# Patient Record
(demographics unavailable — no encounter records)

---

## 2024-12-30 NOTE — COUNSELING
[Cessation strategies including cessation program discussed] : Cessation strategies including cessation program discussed [Use of nicotine replacement therapies and other medications discussed] : Use of nicotine replacement therapies and other medications discussed [Encouraged to pick a quit date and identify support needed to quit] : Encouraged to pick a quit date and identify support needed to quit [Smoking Cessation Program Referral] : Smoking Cessation Program Referral  [Yes] : Willing to quit smoking [ - Annual Lung Cancer Screening/Share Decision Making Discussion] : Annual Lung Cancer Screening/Share Decision Making Discussion. (I have advised this patient to have a Low Dose CT (LDCT) scan of the lungs and have discussed the following with the patient in a shared decision making discussion:   Benefits of Detection and Early Treatment: There is adequate evidence that annual screening for lung cancer with LDCT in a population of high-risk persons can prevent a substantial number of lung cancer-related deaths. The magnitude of benefit depends on the individual patient's risk for lung cancer, as those who are at highest risk are most likely to benefit. Screening cannot prevent most lung cancer-related deaths, and does not replace smoking cessation. Harms of Detection and Early Intervention and Treatment: The harms associated with LDCT screening include false-negative and false-positive results, incidental findings, over diagnosis, and radiation exposure. False-positive LDCT results occur in a substantial proportion of screened persons; 95% of all positive results do not lead to a diagnosis of cancer. In a high-quality screening program, further imaging can resolve most false-positive results; however, some patients may require invasive procedures. Radiation harms, including cancer resulting from cumulative exposure to radiation, vary depending on the age at the start of screening; the number of scans received; and the person's exposure to other sources of radiation, particularly other medical imaging.) [FreeTextEntry2] : advised to call 311, patient has tried chantix in the past did not work for him  [FreeTextEntry1] : 10
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
## 2024-12-30 NOTE — ASSESSMENT
[FreeTextEntry1] : COPD/Emphysema mmrc>2 Unprovoked RLL Segmental and Subsegmental PE with borderline RHS Mediastinal Lymphadenopathy Right Renal Lesion referred to Urology Dilated ascending thoracic aorta referred to Cardiology Hernia pain referred to General Surgery Unspecified Depression referred to Behavioral Health, during my interview patient denies suicidal ideation, no active plan to harm oneself

## 2024-12-30 NOTE — PHYSICAL EXAM
[No Acute Distress] : no acute distress [Normal Oropharynx] : normal oropharynx [Normal Appearance] : normal appearance [No Neck Mass] : no neck mass [Normal Rate/Rhythm] : normal rate/rhythm [Normal S1, S2] : normal s1, s2 [No Murmurs] : no murmurs [No Resp Distress] : no resp distress [Clear to Auscultation Bilaterally] : clear to auscultation bilaterally [No Abnormalities] : no abnormalities [Benign] : benign [Normal Gait] : normal gait [No Clubbing] : no clubbing [No Cyanosis] : no cyanosis [No Edema] : no edema [FROM] : FROM [Normal Color/ Pigmentation] : normal color/ pigmentation [No Focal Deficits] : no focal deficits [Oriented x3] : oriented x3 [Normal Affect] : normal affect [TextBox_68] : bilateral rales, no wheezing

## 2024-12-30 NOTE — REVIEW OF SYSTEMS
Dyspnea] : dyspnea [SOB on Exertion] : sob on exertion [Negative] : Endocrine [TextBox_69] : intermittent pain at hernia site, history of GSW in 1989

## 2024-12-30 NOTE — REASON FOR VISIT
[Initial] : an initial visit [COPD] : COPD [Pulmonary Embolism] : pulmonary embolism [Emphysema] : emphysema

## 2025-01-20 NOTE — PHYSICAL EXAM
[Fully active, able to carry on all pre-disease performance without restriction] : Status 0 - Fully active, able to carry on all pre-disease performance without restriction [Normal] : full range of motion and no deformities appreciated [de-identified] : Ventral abdominal hernia noted.

## 2025-01-20 NOTE — HISTORY OF PRESENT ILLNESS
[de-identified] : 60-year-old male is here for recently diagnosed Pulmonary embolism.  He recently presented to the ED with abdominal pain and CT abdomen incidentally picked up right lower lobe segmental vs subsegmental pulmonary embolism. This finding was confirmed with CTA Chest. Venous Duplex of Lower extremity was negative for blood clots. He was started on Eliquis and discharged home. No prior history or family history of blood clots. He denies any recent long-distance travel, trauma, surgery. No fevers, weight loss or night sweats.  He denied any bleeding from anywhere. His Colonoscopy: more than 5 years ago, removed polyps x9. Advised repeat colonoscopy in 5 years. He is due for one now.  He has mild discomfort occasionally from abdominal hernia but no active complaints.   Imaging findings as follows. CTA Chest and CT abdomen and Pelvis. Right lower lobe segmental/subsegmental pulmonary emboli. Borderline right heart strain. Dilated ascending thoracic aorta measuring 4.5 cm. Prominent mediastinal lymph node is nonspecific and may be reactive. Indeterminate right renal lesion measuring 0.6 cm. Recommend outpatient MRI renal with and without contrast for further evaluation. Dilated ascending thoracic aorta measuring 4.4 cm  Pmhx : HTN, COPD 4 Years ago, abdominal hernia. ? PAD of Left Leg s/p stents 15 years ago. Not on any antiplatelets currently. Meds: Albuterol, Eliquis 5 mg bid. Allergies hx: None. Surgical Hx. 1989 abdominal surgery. Family Hx: Mother had PAD.  Social Hx : Smoker for 40 years 1/2 pack in 2 days. alcoholic. 2 to 3 beers a day.

## 2025-01-20 NOTE — PHYSICAL EXAM
[Fully active, able to carry on all pre-disease performance without restriction] : Status 0 - Fully active, able to carry on all pre-disease performance without restriction [Normal] : full range of motion and no deformities appreciated [de-identified] : Ventral abdominal hernia noted.
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
## 2025-01-20 NOTE — REVIEW OF SYSTEMS
[Negative] : Musculoskeletal [Vomiting] : no vomiting [Constipation] : no constipation [FreeTextEntry7] : occasional abdominal discomfort from hernia

## 2025-01-20 NOTE — REASON FOR VISIT
[Initial Consultation] : an initial consultation for [FreeTextEntry2] : Pulmonary embolism Render Note In Bullet Format When Appropriate: No Duration Of Freeze Thaw-Cycle (Seconds): 0 Post-Care Instructions: I reviewed with the patient in detail post-care instructions. Patient is to wear sunprotection, and avoid picking at any of the treated lesions. Pt may apply Vaseline to crusted or scabbing areas. Render Post-Care Instructions In Note?: yes Detail Level: Detailed Consent: The patient's consent was obtained including but not limited to risks of crusting, scabbing, blistering, scarring, darker or lighter pigmentary change, recurrence, incomplete removal and infection. Medical Necessity Information: It is in your best interest to select a reason for this procedure from the list below. All of these items fulfill various CMS LCD requirements except the new and changing color options. Medical Necessity Clause: This procedure was medically necessary because the lesions that were treated were:

## 2025-01-20 NOTE — PHYSICAL EXAM
[Fully active, able to carry on all pre-disease performance without restriction] : Status 0 - Fully active, able to carry on all pre-disease performance without restriction [Normal] : full range of motion and no deformities appreciated [de-identified] : Ventral abdominal hernia noted.

## 2025-01-20 NOTE — PHYSICAL EXAM
[Fully active, able to carry on all pre-disease performance without restriction] : Status 0 - Fully active, able to carry on all pre-disease performance without restriction [Normal] : full range of motion and no deformities appreciated [de-identified] : Ventral abdominal hernia noted.

## 2025-01-20 NOTE — HISTORY OF PRESENT ILLNESS
[de-identified] : 60-year-old male is here for recently diagnosed Pulmonary embolism.  He recently presented to the ED with abdominal pain and CT abdomen incidentally picked up right lower lobe segmental vs subsegmental pulmonary embolism. This finding was confirmed with CTA Chest. Venous Duplex of Lower extremity was negative for blood clots. He was started on Eliquis and discharged home. No prior history or family history of blood clots. He denies any recent long-distance travel, trauma, surgery. No fevers, weight loss or night sweats.  He denied any bleeding from anywhere. His Colonoscopy: more than 5 years ago, removed polyps x9. Advised repeat colonoscopy in 5 years. He is due for one now.  He has mild discomfort occasionally from abdominal hernia but no active complaints.   Imaging findings as follows. CTA Chest and CT abdomen and Pelvis. Right lower lobe segmental/subsegmental pulmonary emboli. Borderline right heart strain. Dilated ascending thoracic aorta measuring 4.5 cm. Prominent mediastinal lymph node is nonspecific and may be reactive. Indeterminate right renal lesion measuring 0.6 cm. Recommend outpatient MRI renal with and without contrast for further evaluation. Dilated ascending thoracic aorta measuring 4.4 cm  Pmhx : HTN, COPD 4 Years ago, abdominal hernia. ? PAD of Left Leg s/p stents 15 years ago. Not on any antiplatelets currently. Meds: Albuterol, Eliquis 5 mg bid. Allergies hx: None. Surgical Hx. 1989 abdominal surgery. Family Hx: Mother had PAD.  Social Hx : Smoker for 40 years 1/2 pack in 2 days. alcoholic. 2 to 3 beers a day.

## 2025-01-20 NOTE — HISTORY OF PRESENT ILLNESS
[de-identified] : 60-year-old male is here for recently diagnosed Pulmonary embolism.  He recently presented to the ED with abdominal pain and CT abdomen incidentally picked up right lower lobe segmental vs subsegmental pulmonary embolism. This finding was confirmed with CTA Chest. Venous Duplex of Lower extremity was negative for blood clots. He was started on Eliquis and discharged home. No prior history or family history of blood clots. He denies any recent long-distance travel, trauma, surgery. No fevers, weight loss or night sweats.  He denied any bleeding from anywhere. His Colonoscopy: more than 5 years ago, removed polyps x9. Advised repeat colonoscopy in 5 years. He is due for one now.  He has mild discomfort occasionally from abdominal hernia but no active complaints.   Imaging findings as follows. CTA Chest and CT abdomen and Pelvis. Right lower lobe segmental/subsegmental pulmonary emboli. Borderline right heart strain. Dilated ascending thoracic aorta measuring 4.5 cm. Prominent mediastinal lymph node is nonspecific and may be reactive. Indeterminate right renal lesion measuring 0.6 cm. Recommend outpatient MRI renal with and without contrast for further evaluation. Dilated ascending thoracic aorta measuring 4.4 cm  Pmhx : HTN, COPD 4 Years ago, abdominal hernia. ? PAD of Left Leg s/p stents 15 years ago. Not on any antiplatelets currently. Meds: Albuterol, Eliquis 5 mg bid. Allergies hx: None. Surgical Hx. 1989 abdominal surgery. Family Hx: Mother had PAD.  Social Hx : Smoker for 40 years 1/2 pack in 2 days. alcoholic. 2 to 3 beers a day.

## 2025-01-20 NOTE — HISTORY OF PRESENT ILLNESS
[de-identified] : 60-year-old male is here for recently diagnosed Pulmonary embolism.  He recently presented to the ED with abdominal pain and CT abdomen incidentally picked up right lower lobe segmental vs subsegmental pulmonary embolism. This finding was confirmed with CTA Chest. Venous Duplex of Lower extremity was negative for blood clots. He was started on Eliquis and discharged home. No prior history or family history of blood clots. He denies any recent long-distance travel, trauma, surgery. No fevers, weight loss or night sweats.  He denied any bleeding from anywhere. His Colonoscopy: more than 5 years ago, removed polyps x9. Advised repeat colonoscopy in 5 years. He is due for one now.  He has mild discomfort occasionally from abdominal hernia but no active complaints.   Imaging findings as follows. CTA Chest and CT abdomen and Pelvis. Right lower lobe segmental/subsegmental pulmonary emboli. Borderline right heart strain. Dilated ascending thoracic aorta measuring 4.5 cm. Prominent mediastinal lymph node is nonspecific and may be reactive. Indeterminate right renal lesion measuring 0.6 cm. Recommend outpatient MRI renal with and without contrast for further evaluation. Dilated ascending thoracic aorta measuring 4.4 cm  Pmhx : HTN, COPD 4 Years ago, abdominal hernia. ? PAD of Left Leg s/p stents 15 years ago. Not on any antiplatelets currently. Meds: Albuterol, Eliquis 5 mg bid. Allergies hx: None. Surgical Hx. 1989 abdominal surgery. Family Hx: Mother had PAD.  Social Hx : Smoker for 40 years 1/2 pack in 2 days. alcoholic. 2 to 3 beers a day.

## 2025-01-20 NOTE — ASSESSMENT
[FreeTextEntry1] : 60-year-old male is here for unprovoked Right lower lobe Segmental/Sub segmental PE.  1.Unprovoked Right lower lobe Segmental/Subsegmental PE. c/w Eliquis 5 mg bid. Labs today: cbc, cmp,  Factor V, Prothrombin, IgG and IgM anticardiolipin, IgG and IgM beta2 glycoprotein, jak2, PNH. will perform antithrombin III, protein C, protein S and Lupus anticoagulant after 3 months of anticoagulation. Advised to follow with GI for screening colonoscopy as he is due for a colonoscopy.  Dietary changes advised.  2. Mild Macrocytic anemia noted, likely related to alcoholism will check folate and B12.  3.Prominent mediastinal lymph node is nonspecific and may be reactive. Pulmonology following. Scheduled for repeat CT Chest in 3.2025.  4. Incidental indeterminate right renal lesion noted. Recommended outpatient MRI renal with and without contrast for further evaluation. ordered MRI kidney.  5. Incidental Dilated ascending thoracic aorta measuring 4.4 cm noted. Advised to follow up with CT surgery.   RTC in 2 months.

## 2025-03-26 NOTE — REVIEW OF SYSTEMS
[Negative] : Neurological [Vomiting] : no vomiting [Constipation] : no constipation [FreeTextEntry7] : occasional abdominal discomfort from hernia

## 2025-03-26 NOTE — PHYSICAL EXAM
[Fully active, able to carry on all pre-disease performance without restriction] : Status 0 - Fully active, able to carry on all pre-disease performance without restriction [Normal] : full range of motion and no deformities appreciated [de-identified] : Ventral abdominal hernia noted.

## 2025-03-26 NOTE — HISTORY OF PRESENT ILLNESS
[de-identified] : 60-year-old male is here for recently diagnosed Pulmonary embolism.  He recently presented to the ED with abdominal pain and CT abdomen incidentally picked up right lower lobe segmental vs subsegmental pulmonary embolism. This finding was confirmed with CTA Chest. Venous Duplex of Lower extremity was negative for blood clots. He was started on Eliquis and discharged home. No prior history or family history of blood clots. He denies any recent long-distance travel, trauma, surgery. No fevers, weight loss or night sweats.  He denied any bleeding from anywhere. His Colonoscopy: more than 5 years ago, removed polyps x9. Advised repeat colonoscopy in 5 years. He is due for one now.  He has mild discomfort occasionally from abdominal hernia but no active complaints.   Imaging findings as follows. CTA Chest and CT abdomen and Pelvis. Right lower lobe segmental/subsegmental pulmonary emboli. Borderline right heart strain. Dilated ascending thoracic aorta measuring 4.5 cm. Prominent mediastinal lymph node is nonspecific and may be reactive. Indeterminate right renal lesion measuring 0.6 cm. Recommend outpatient MRI renal with and without contrast for further evaluation. Dilated ascending thoracic aorta measuring 4.4 cm  Pmhx : HTN, COPD 4 Years ago, abdominal hernia. ? PAD of Left Leg s/p stents 15 years ago. Not on any antiplatelets currently. Meds: Albuterol, Eliquis 5 mg bid. Allergies hx: None. Surgical Hx. 1989 abdominal surgery. Family Hx: Mother had PAD.  Social Hx : Smoker for 40 years 1/2 pack in 2 days. alcoholic. 2 to 3 beers a day.   [de-identified] : 3.25.25 He is here for follow up of Incidental PE found on imaging. He completed 3 months of eliquis so far. Still smoking. retired. Has some arthritis of right knee.

## 2025-03-26 NOTE — HISTORY OF PRESENT ILLNESS
[de-identified] : 60-year-old male is here for recently diagnosed Pulmonary embolism.  He recently presented to the ED with abdominal pain and CT abdomen incidentally picked up right lower lobe segmental vs subsegmental pulmonary embolism. This finding was confirmed with CTA Chest. Venous Duplex of Lower extremity was negative for blood clots. He was started on Eliquis and discharged home. No prior history or family history of blood clots. He denies any recent long-distance travel, trauma, surgery. No fevers, weight loss or night sweats.  He denied any bleeding from anywhere. His Colonoscopy: more than 5 years ago, removed polyps x9. Advised repeat colonoscopy in 5 years. He is due for one now.  He has mild discomfort occasionally from abdominal hernia but no active complaints.   Imaging findings as follows. CTA Chest and CT abdomen and Pelvis. Right lower lobe segmental/subsegmental pulmonary emboli. Borderline right heart strain. Dilated ascending thoracic aorta measuring 4.5 cm. Prominent mediastinal lymph node is nonspecific and may be reactive. Indeterminate right renal lesion measuring 0.6 cm. Recommend outpatient MRI renal with and without contrast for further evaluation. Dilated ascending thoracic aorta measuring 4.4 cm  Pmhx : HTN, COPD 4 Years ago, abdominal hernia. ? PAD of Left Leg s/p stents 15 years ago. Not on any antiplatelets currently. Meds: Albuterol, Eliquis 5 mg bid. Allergies hx: None. Surgical Hx. 1989 abdominal surgery. Family Hx: Mother had PAD.  Social Hx : Smoker for 40 years 1/2 pack in 2 days. alcoholic. 2 to 3 beers a day.   [de-identified] : 3.25.25 He is here for follow up of Incidental PE found on imaging. He completed 3 months of eliquis so far. Still smoking. retired. Has some arthritis of right knee.

## 2025-03-26 NOTE — PHYSICAL EXAM
[Fully active, able to carry on all pre-disease performance without restriction] : Status 0 - Fully active, able to carry on all pre-disease performance without restriction [Normal] : full range of motion and no deformities appreciated [de-identified] : Ventral abdominal hernia noted.

## 2025-03-26 NOTE — ASSESSMENT
[FreeTextEntry1] : 60-year-old male is here for unprovoked Right lower lobe Segmental/Sub segmental PE.  1.Unprovoked Right lower lobe Segmental/Subsegmental PE. completed 3 months of Eliquis 5 mg bid.  Factor V, Prothrombin, IgG and IgM anticardiolipin, IgG and IgM beta2 glycoprotein, jak2, PNH negative.  labs today :  Pt advised to stop eliquis for one week and  perform antithrombin III, protein C, protein S, Lupus anticoagulant and D dimers. will likely advise to take lower dose of anticoagulation  eliquis 2.5 mg bid after the blood work given active smoking, and right knee issues. Advised to follow with GI for screening colonoscopy as he is due for a colonoscopy.  Dietary changes advised.  2. Mild Macrocytic anemia noted, likely related to alcoholism Normal folate and B12.  3.Prominent mediastinal lymph node is nonspecific and may be reactive. Pulmonology following. Scheduled for repeat CT Chest in 3.2025. missed appointments. ordered CT CAP with contrast for further evaluation.  4. Incidental indeterminate right renal lesion noted. MRI revealed 0.6 cm hemorrhagic cyst corresponding to area of interest. no suspicious mass.  5. Incidental Dilated ascending thoracic aorta measuring 4.4 cm noted. Advised to follow up with CT surgery.   Follow up based on blood work.